# Patient Record
Sex: FEMALE | Race: WHITE
[De-identification: names, ages, dates, MRNs, and addresses within clinical notes are randomized per-mention and may not be internally consistent; named-entity substitution may affect disease eponyms.]

---

## 2019-10-04 ENCOUNTER — HOSPITAL ENCOUNTER (OUTPATIENT)
Dept: HOSPITAL 88 - US | Age: 59
End: 2019-10-04
Attending: INTERNAL MEDICINE
Payer: COMMERCIAL

## 2019-10-04 DIAGNOSIS — R11.0: Primary | ICD-10-CM

## 2019-10-04 PROCEDURE — 76700 US EXAM ABDOM COMPLETE: CPT

## 2019-10-04 NOTE — DIAGNOSTIC IMAGING REPORT
EXAM: US ABDOMEN COMPLETE

DATE: 10/4/2019 3:55 PM  

INDICATION:   Nausea

COMPARISON: Abdomen and pelvis CT of 6/18/2009 

TECHNIQUE: Transverse and longitudinal gray scale and color doppler sonographic

images of the upper abdomen were obtained. 



FINDINGS: 

There is no evidence of fluid or masses seen in the area of clinical concern in

the right lower quadrant. 

    

LIVER

13.3 cm in the right midclavicular line.

Normal echogenicity of the liver with normal contour, no masses.



SPLEEN

10.0 cm in maximum diameter.

Normal echogenicity, no masses.



GALLBLADDER

No gallbladder wall thickening, distension, stone, or pericholecystic fluid.

NEgative reported sonographic Robledo's sign. Gallbladder wall measures 4 mm



BILE DUCTS

No intra nor extra-hepatic biliary dilation.

Common bile duct measures 2 mm



PANCREAS: Visualized portions are normal.



RIGHT KIDNEY: 10.7 cm

Echogenicity: Normal

Collecting System:  No hydronephrosis

Stones:  3 mm mid pole echogenic focus may correspond with the similarly sized

renal calculus seen on CT from 6/18/2009.

Cyst/Mass: None



LEFT KIDNEY: 11.3 cm

Echogenicity: Normal

Collecting System:  No hydronephrosis

Stones:  None

Cyst/Mass: None



VESSELS:

Aorta: Visualized portions are within normal size limits

Inferior Vena Cava: Visualized portions are normal

Main Portal Vein: 1.1 cm, normal size with hepatopetal flow.



FREE FLUID: None



IMPRESSION:

No cholelithiasis or sonographic evidence of cholecystitis.



3 mm right midpole nonobstructive calculus. No hydronephrosis.



Signed by: Bhavna Lincoln MD on 10/4/2019 4:46 PM

## 2020-07-13 LAB
ALBUMIN SERPL-MCNC: 4 G/DL (ref 3.5–5)
ALBUMIN/GLOB SERPL: 1.3 {RATIO} (ref 0.8–2)
ALP SERPL-CCNC: 93 IU/L (ref 40–150)
ALT SERPL-CCNC: 17 IU/L (ref 0–55)
ANION GAP SERPL CALC-SCNC: 11.4 MMOL/L (ref 8–16)
BASOPHILS # BLD AUTO: 0 10*3/UL (ref 0–0.1)
BASOPHILS NFR BLD AUTO: 0.5 % (ref 0–1)
BUN SERPL-MCNC: 15 MG/DL (ref 7–26)
BUN/CREAT SERPL: 17 (ref 6–25)
CALCIUM SERPL-MCNC: 9.3 MG/DL (ref 8.4–10.2)
CHLORIDE SERPL-SCNC: 108 MMOL/L (ref 98–107)
CO2 SERPL-SCNC: 27 MMOL/L (ref 22–29)
DEPRECATED NEUTROPHILS # BLD AUTO: 4.4 10*3/UL (ref 2.1–6.9)
EGFRCR SERPLBLD CKD-EPI 2021: > 60 ML/MIN (ref 60–?)
EOSINOPHIL # BLD AUTO: 0.1 10*3/UL (ref 0–0.4)
EOSINOPHIL NFR BLD AUTO: 1.8 % (ref 0–6)
ERYTHROCYTE [DISTWIDTH] IN CORD BLOOD: 12.6 % (ref 11.7–14.4)
GLOBULIN PLAS-MCNC: 3.2 G/DL (ref 2.3–3.5)
GLUCOSE SERPLBLD-MCNC: 117 MG/DL (ref 74–118)
HCT VFR BLD AUTO: 45 % (ref 34.2–44.1)
HGB BLD-MCNC: 14.6 G/DL (ref 12–16)
LYMPHOCYTES # BLD: 2.8 10*3/UL (ref 1–3.2)
LYMPHOCYTES NFR BLD AUTO: 34.9 % (ref 18–39.1)
MCH RBC QN AUTO: 30.6 PG (ref 28–32)
MCHC RBC AUTO-ENTMCNC: 32.4 G/DL (ref 31–35)
MCV RBC AUTO: 94.3 FL (ref 81–99)
MONOCYTES # BLD AUTO: 0.6 10*3/UL (ref 0.2–0.8)
MONOCYTES NFR BLD AUTO: 7.7 % (ref 4.4–11.3)
NEUTS SEG NFR BLD AUTO: 55 % (ref 38.7–80)
PLATELET # BLD AUTO: 186 X10E3/UL (ref 140–360)
POTASSIUM SERPL-SCNC: 4.4 MMOL/L (ref 3.5–5.1)
RBC # BLD AUTO: 4.77 X10E6/UL (ref 3.6–5.1)
SODIUM SERPL-SCNC: 142 MMOL/L (ref 136–145)

## 2020-07-17 ENCOUNTER — HOSPITAL ENCOUNTER (OUTPATIENT)
Dept: HOSPITAL 88 - OR | Age: 60
Discharge: HOME | End: 2020-07-17
Attending: UROLOGY
Payer: COMMERCIAL

## 2020-07-17 VITALS — SYSTOLIC BLOOD PRESSURE: 166 MMHG | DIASTOLIC BLOOD PRESSURE: 75 MMHG

## 2020-07-17 DIAGNOSIS — I45.10: ICD-10-CM

## 2020-07-17 DIAGNOSIS — F41.9: ICD-10-CM

## 2020-07-17 DIAGNOSIS — N13.2: Primary | ICD-10-CM

## 2020-07-17 DIAGNOSIS — N81.10: ICD-10-CM

## 2020-07-17 DIAGNOSIS — Z11.59: ICD-10-CM

## 2020-07-17 DIAGNOSIS — Z01.810: ICD-10-CM

## 2020-07-17 DIAGNOSIS — Z01.812: ICD-10-CM

## 2020-07-17 DIAGNOSIS — N39.0: ICD-10-CM

## 2020-07-17 DIAGNOSIS — Z88.0: ICD-10-CM

## 2020-07-17 DIAGNOSIS — Z88.1: ICD-10-CM

## 2020-07-17 PROCEDURE — 74420 UROGRAPHY RTRGR +-KUB: CPT

## 2020-07-17 PROCEDURE — 52353 CYSTOURETERO W/LITHOTRIPSY: CPT

## 2020-07-17 PROCEDURE — 80053 COMPREHEN METABOLIC PANEL: CPT

## 2020-07-17 PROCEDURE — 93005 ELECTROCARDIOGRAM TRACING: CPT

## 2020-07-17 PROCEDURE — 36415 COLL VENOUS BLD VENIPUNCTURE: CPT

## 2020-07-17 PROCEDURE — 87086 URINE CULTURE/COLONY COUNT: CPT

## 2020-07-17 PROCEDURE — 85025 COMPLETE CBC W/AUTO DIFF WBC: CPT

## 2020-07-17 NOTE — OPERATIVE REPORT
DATE OF PROCEDURE:  07/17/2020

 

SURGEON:  Pro Burger MD

 

PREOPERATIVE DIAGNOSIS:  Left ureteral calculi.

 

POSTOPERATIVE DIAGNOSIS:  Left ureteral calculi.

 

OPERATION PERFORMED:  Cystoscopy, retrograde pyelogram, rigid ureteroscopy with dusting

laser lithotripsy. 

 

ANESTHESIOLOGIST:  Staff.

 

ANESTHESIA:  General.

 

FINDINGS:  The patient has a grade 1-2 cystocele.  Urethra is normal.  Bladder neck

normal.  Bladder is normal.  No evidence of petechiae, glomerulation, or ulcerations.

Right ureteral orifice is normal.  Left ureteral orifice is extremely swollen.  The area

of the mucosa over the intramural portion of the ureter also has some petechiae and

submucosal bleeding.  Retrograde pyelogram does show stone to be in the UVJ and

lithotripsy was performed. 

 

PROCEDURE IN DETAIL:  With the patient under satisfactory general anesthesia, the

patient was placed in the supine position on the operating table.  Legs were placed on

stirrups.  Genitalia was then prepped with Betadine soap and solution and draped in

usual manner.  Cystoscopy was performed with a 21 degree angle lens.  The #8 cone-tip

ureteral catheter was used to do a retrograde pyelogram by injecting contrast media on

the right side first and on the left side second.  Approximately 5 mL were injected on

the left side, most of it did come out around the catheter.  On the right side no

filling defects were seen except in the lower pole collecting system where there

appeared to be a couple little 2-3 mm stones.  In fact CT scan does show that she had

the stones there.  At this time, I attempted to pass an extra stiff guidewire up the

left ureter that failed.  I put an open-ended catheter and tried to pass the guidewire

over the open-ended catheter and that failed as well.  Therefore, I removed the

cystoscope and replaced it with the semi-flexible rigid short ureteral scope.  I was

able then to go up the ureteral orifice and immediately see the stone impacted at the

ureterovesical junction.  I introduced 300 micron fiber and using the dusting energy, I

proceeded to slowly dust the stone.  I took a while because the stone had been impacted

and finally I got it off the ureteral mucosa.  After that, then I went up and down the

ureter for about 3-4 cm, breaking up all those fragments until it was completely dusted.

 I did not try to just break the stone to collect the specimen because the patient did

not want to have a double-J.  I would have used that much energy then probably would

have had to left double-J.  At the end of the procedure I did not see any mucosal edema.

 There was no bleeding as well.  Therefore, I removed the instrument leaving no double-J

and I got an x-ray did not show any stones.  At this point, the patient was taken to the

recovery room. 

 

DISCHARGE INSTRUCTIONS:  She was given tramadol for pain and Cipro.  She will be seen in

the office again in approximately two weeks to check urine.  I discussed with the

patient's father over the phone what was done and when I expect to see her in the

office. 

 

 

 

______________________________

MD EDUARDO Burden/MODL

D:  07/17/2020 13:28:38

T:  07/17/2020 16:10:18

Job #:  [id number]